# Patient Record
Sex: MALE | Race: WHITE | URBAN - METROPOLITAN AREA
[De-identification: names, ages, dates, MRNs, and addresses within clinical notes are randomized per-mention and may not be internally consistent; named-entity substitution may affect disease eponyms.]

---

## 2022-09-30 ENCOUNTER — TELEPHONE (OUTPATIENT)
Dept: ADMINISTRATIVE | Age: 32
End: 2022-09-30

## 2022-09-30 NOTE — TELEPHONE ENCOUNTER
Patient experiencing pain and hearing loss from right ear. States he can feel water swooshing in ear. He has tried to use over counter ear drops but has little relief. Patient does not have insurance or PCP.  Please advise scheduling

## 2022-09-30 NOTE — TELEPHONE ENCOUNTER
MA spoke with pt, scheduled 11/10 with NP    Electronically signed by Constanza Finch MA on 9/30/22 at 10:15 AM EDT

## 2022-11-10 ENCOUNTER — OFFICE VISIT (OUTPATIENT)
Dept: ENT CLINIC | Age: 32
End: 2022-11-10

## 2022-11-10 VITALS
DIASTOLIC BLOOD PRESSURE: 75 MMHG | BODY MASS INDEX: 27.3 KG/M2 | SYSTOLIC BLOOD PRESSURE: 126 MMHG | TEMPERATURE: 97 F | HEART RATE: 69 BPM | WEIGHT: 195 LBS | HEIGHT: 71 IN | OXYGEN SATURATION: 98 %

## 2022-11-10 DIAGNOSIS — H61.23 BILATERAL IMPACTED CERUMEN: ICD-10-CM

## 2022-11-10 DIAGNOSIS — H91.93 DECREASED HEARING OF BOTH EARS: Primary | ICD-10-CM

## 2022-11-10 PROCEDURE — 99204 OFFICE O/P NEW MOD 45 MIN: CPT

## 2022-11-10 PROCEDURE — 69210 REMOVE IMPACTED EAR WAX UNI: CPT

## 2022-11-10 RX ORDER — CIPROFLOXACIN HYDROCHLORIDE 3.5 MG/ML
4 SOLUTION/ DROPS TOPICAL 2 TIMES DAILY
Qty: 1 EACH | Refills: 0 | Status: SHIPPED | OUTPATIENT
Start: 2022-11-10 | End: 2022-11-17

## 2022-11-10 RX ORDER — CETIRIZINE HYDROCHLORIDE 10 MG/1
10 TABLET ORAL DAILY
COMMUNITY

## 2022-11-10 ASSESSMENT — ENCOUNTER SYMPTOMS
ALLERGIC/IMMUNOLOGIC NEGATIVE: 1
BACK PAIN: 0
EYE DISCHARGE: 0
SINUS PRESSURE: 0
DIARRHEA: 0
RHINORRHEA: 0
VOMITING: 0
EYE PAIN: 0
SHORTNESS OF BREATH: 0
SORE THROAT: 0
COUGH: 0

## 2022-11-10 NOTE — PROGRESS NOTES
Subjective:     Patient ID:  Ely Desir is a 28 y.o. male. HPI:    Hearing Loss  Patient presents today with complaints of hearing loss. Concern regarding hearing has been present for 1 year. He has failed a prior hearing test.The patient reports difficulty hearing, turning up the T.V., saying \"huh\" or \"what\". Patient does not have hearing aids at this time. History of Head trauma: no   Description: none  History of surgery to the head/neck: no   Description:none  History of cerumen impaction: no  History of noise exposure: yes   Type: Work as an  in college  Spinning: no  Hearing loss: yes    Fluctuating: yes  Aural pressure: yes  Tinnitus:no  Otalgia:no    History reviewed. No pertinent past medical history. History reviewed. No pertinent surgical history. Family History   Problem Relation Age of Onset    Cancer Maternal Grandfather     Hypertension Maternal Grandfather     Alcohol Abuse Maternal Grandfather      Social History     Socioeconomic History    Marital status: Single     Spouse name: None    Number of children: None    Years of education: None    Highest education level: None   Tobacco Use    Smoking status: Never    Smokeless tobacco: Never   Substance and Sexual Activity    Alcohol use: Yes     Comment: occasionally    Drug use: Not Currently     No Known Allergies      Review of Systems   Constitutional:  Negative for chills and fever. HENT:  Positive for ear pain and hearing loss. Negative for congestion, ear discharge, postnasal drip, rhinorrhea, sinus pressure, sneezing, sore throat and tinnitus. Facial swelling: Right ear pressure. .   Eyes:  Negative for pain and discharge. Respiratory:  Negative for cough and shortness of breath. Cardiovascular:  Negative for chest pain. Gastrointestinal:  Negative for diarrhea and vomiting. Genitourinary:  Negative for flank pain. Musculoskeletal:  Negative for back pain and neck pain. Skin:  Negative for rash. Allergic/Immunologic: Negative. Neurological:  Negative for dizziness, syncope and headaches. All other systems reviewed and are negative. Objective:     Physical Exam  Vitals reviewed. Constitutional:       Appearance: Normal appearance. HENT:      Head: Normocephalic and atraumatic. Jaw: There is normal jaw occlusion. No tenderness. Right Ear: Tympanic membrane, ear canal and external ear normal. Decreased hearing noted. There is impacted cerumen. Left Ear: Tympanic membrane, ear canal and external ear normal. Decreased hearing noted. There is impacted cerumen. Nose: Septal deviation present. No congestion. Right Turbinates: Not swollen or pale. Left Turbinates: Not swollen or pale. Mouth/Throat:      Lips: Pink. Mouth: Mucous membranes are moist.      Pharynx: Oropharynx is clear. Eyes:      General: Lids are normal.      Conjunctiva/sclera: Conjunctivae normal.      Pupils: Pupils are equal, round, and reactive to light. Cardiovascular:      Rate and Rhythm: Normal rate and regular rhythm. Pulses: Normal pulses. Pulmonary:      Effort: Pulmonary effort is normal. No respiratory distress. Breath sounds: No stridor. Abdominal:      General: Abdomen is flat. Palpations: Abdomen is soft. Musculoskeletal:         General: Normal range of motion. Cervical back: Normal range of motion. No rigidity. Skin:     General: Skin is warm and dry. Neurological:      General: No focal deficit present. Mental Status: He is alert and oriented to person, place, and time. Psychiatric:         Attention and Perception: Attention normal.         Mood and Affect: Affect normal.         Behavior: Behavior normal. Behavior is cooperative. Thought Content: Thought content normal.         Judgment: Judgment normal.     Cerumen removal   Auditorycanal(s) both ears completely obstructed with cerumen. Cerumen was gently removed using soft plastic curette, alligator forceps, gentle irrigation and suction. Tympanic membranes are intact following the procedure. The right auditory canal appeared normal.  The left auditory canal appeared inflamed with a scant amount of bleeding noted. Assessment:       Diagnosis Orders   1. Decreased hearing of both ears        2. Bilateral impacted cerumen            Plan:      Britta Mcfarland, was seen and evaluated today for complaints of muffled hearing with right ear pressure over the past 6 months. Upon examination bilateral EACs were noted to be deeply impacted with a large amount of cerumen. As noted above a cerumen removal was completed in the office. The patient tolerated the procedure well. Upon removal of the cerumen the patient noted an immediate relief of right ear pressure and reported increased hearing bilaterally. Due to the sudden improvement in hearing a hearing test was offered but withheld at this time. Due to the irritation in the left EAC the patient will be started on Cipro drops 4 drops to the left ear twice daily for 1 week. He will return in 2 to 4 weeks for reevaluation of symptoms. Biweekly Debrox use was discussed with the patient for maintenance. He agrees to this plan. He was instructed to call for any new or worsening symptoms prior to his next appointment. Follow up in 1 month(s)    Jesse Cisneros.  Chey Mckeon MSN, FNP-BC  [facility]    The information contained in this note has been dictated using drug and medical speech recognition software and may contain errors